# Patient Record
Sex: FEMALE | URBAN - METROPOLITAN AREA
[De-identification: names, ages, dates, MRNs, and addresses within clinical notes are randomized per-mention and may not be internally consistent; named-entity substitution may affect disease eponyms.]

---

## 2020-10-06 ENCOUNTER — OFFICE VISIT (OUTPATIENT)
Dept: URBAN - METROPOLITAN AREA CLINIC 58 | Facility: CLINIC | Age: 25
End: 2020-10-06
Payer: COMMERCIAL

## 2020-10-06 DIAGNOSIS — H53.022 REFRACTIVE AMBLYOPIA, LEFT EYE: Primary | ICD-10-CM

## 2020-10-06 DIAGNOSIS — H52.13 MYOPIA, BILATERAL: ICD-10-CM

## 2020-10-06 PROCEDURE — 92004 COMPRE OPH EXAM NEW PT 1/>: CPT | Performed by: OPTOMETRIST

## 2020-10-06 ASSESSMENT — INTRAOCULAR PRESSURE
OS: 18
OD: 20

## 2020-10-06 ASSESSMENT — VISUAL ACUITY
OS: 20/20
OD: 20/20

## 2020-10-06 ASSESSMENT — KERATOMETRY
OS: 43.50
OD: 42.75

## 2020-10-06 NOTE — IMPRESSION/PLAN
Impression: Refractive amblyopia, left eye: H53.022. Plan: Discussed diagnosis in detail with patient. Advised patient of condition. Will continue to observe condition and or symptoms. Recommend glasses. Call if [address] worsens.